# Patient Record
(demographics unavailable — no encounter records)

---

## 2024-11-23 NOTE — PHYSICAL EXAM
[Clear] : right tympanic membrane clear [Erythema] : no erythema [Bulging] : not bulging [Purulent Effusion] : no purulent effusion [Erythematous Oropharynx] : nonerythematous oropharynx [Supple] : supple [Wheezing] : wheezing [Rales] : no rales [Rhonchi] : no rhonchi [Subcostal Retractions] : no subcostal retractions [Suprasternal Retractions] : no suprasternal retractions [No Abnormal Lymph Nodes Palpated] : no abnormal lymph nodes palpated [NL] : warm, clear [FreeTextEntry4] : congestion

## 2024-11-23 NOTE — HISTORY OF PRESENT ILLNESS
[de-identified] : barking cough, congestion [FreeTextEntry6] : Barking cough x 3 days, now coughing is getting worse and disrupting his sleep at night. Upright he is ok, but at nighttime cough is worse. Not productive cough, dry, barking.

## 2024-11-23 NOTE — DISCUSSION/SUMMARY
[FreeTextEntry1] : Pt with wheezing on exam and worsening cough, disruptive to sleep. Through discussion with parents will test with RVP to assess for mycoplasma and RSV, which could be causing wheezing as well. If symptoms not improved in 48 hours and RSV negative and/or mycoplasma positive, will treat with Azithromycin. Return precautions discussed. Continue supportive care.

## 2024-12-11 NOTE — HISTORY OF PRESENT ILLNESS
[de-identified] : lethargic and not drinking  [FreeTextEntry6] : 5 days fever 103-105 RSV pos sunday at [facility] ER Now tachypneic and hypoxemic with patchy rales Tired

## 2024-12-11 NOTE — DISCUSSION/SUMMARY
[FreeTextEntry1] : I called over to [facility] and [facility], we decided to admit to [place].  I spoke to the hospitalist who can see him in the emergency room and help facilitate admission for rehydration and treatment for community-acquired pneumonia.  I do not think this is RSV alone.  I will be available to discuss care through the night as needed.

## 2024-12-11 NOTE — PHYSICAL EXAM
[Tired appearing] : tired appearing [NL] : moves all extremities x4, warm, well perfused x4 [de-identified] : Dry mucous membranes [FreeTextEntry7] : Moving air but patchy rales, good air entry [de-identified] : Pale slightly cyanotic lips

## 2024-12-26 NOTE — HISTORY OF PRESENT ILLNESS
[de-identified] : CONSULT [FreeTextEntry6] : Fine motor and speech concerns Discussed social development in detai;

## 2024-12-26 NOTE — HISTORY OF PRESENT ILLNESS
[de-identified] : CONSULT [FreeTextEntry6] : Fine motor and speech concerns Discussed social development in detai;

## 2025-01-04 NOTE — DISCUSSION/SUMMARY
[FreeTextEntry1] :  ASTHMA: Asthma is a disease of airways (tubes or conduits that air flows through), which may be affected in two separate but related ways. The first is the problem of inflammation and mucus inside the center of the airways, which causes clogging and restricts air flow. Secondly, muscles that encircle the outside of airways may spasm. When triggered, these muscles will tighten around the airways, also causing restriction to air flow. The combination of swelling inside and muscles spasming around the outside of airways causes wheezing as air flow is obstructed.  Albuterol is an inhaled medication that works to open ("un-crush") airways by relaxing those muscles. Oral steroids like prednisone and prednisolone can help alleviate swelling. Asthma triggers may occur alone or in combinations and include common colds, allergens, smoke, pollution, mold, and exercise. Avoiding triggers is key, but not all triggers can or should be avoided. Exercise is good for asthmatic patients and should not be avoided. However, the asthma can and should be prevented from triggering exercise induced asthma (also known as "sports asthma"). One in 10 Olympic athletes has asthma; it does not need to be a quality of life limiting disease.  DIAGNOSIS: Recurrent wheezing or very disruptive coughing that is relieved by albuterol and prednisone (prednisolone) is the most common method of diagnosing asthma in young children.  Once diagnosed, asthma may need a different (preventive) approach. Namely, some children need a daily inhaler containing a (low dose) steroid to prevent wheezing that may be triggered by common colds, exercise, or allergens.  ACTION PLANS: Having back up medications may keep patients safe by providing timely intervention of treatments that open airways. Moreover, the backup medications may avoid the need to find access to urgent care. Use the backup medications as needed and follow up with your doctor as directed. RELATED CONDITIONS: Sometimes large adenoids or acid reflux may mimic asthma. And otherwise, sometimes these problems can trigger asthma. Snoring is a common sign of large adenoids, but reflux is an easy diagnosis to overlook when its main symptoms are manifesting more as cough, and less abdominal pain and nausea.

## 2025-01-04 NOTE — HISTORY OF PRESENT ILLNESS
[de-identified] : Wet Cough [FreeTextEntry6] : 4 days last 2 nights a lot of coughing 2 days clear rhinorrhea   Mom had EIA  He has exercise induced cough when sick the cough from RSV lasted a while and was disruptive

## 2025-01-04 NOTE — CARE PLAN
[Care Plan reviewed and provided to patient/caregiver] : Care plan reviewed and provided to patient/caregiver [Care Plan reviewed every ___ weeks] : Care plan reviewed every [unfilled] weeks [Care Plan managed/Care coordinated by: ___] : Care plan managed/Care coordinated by: [unfilled] [FreeTextEntry2] :  Watch for asthma triggered by upper respiratory infections.Watch for asthma triggered by exercise and or allergies. Watch for nightly snoring which can be a sign of large adenoids; large adenoids can worsen asthma. Watch for abdominal complaints that may be a sign of reflux, which can also worsen asthma. [FreeTextEntry3] : Rx and expectant care. Follow up as need for fever trend, new, or worsening symptoms.

## 2025-02-24 NOTE — HISTORY OF PRESENT ILLNESS
[de-identified] : fever/runny nose  [FreeTextEntry6] : 3 days started 103 now trending down to 101 Clear runny nose day 1-23 now green  Wet cough Sore throat Slept poor from cough   Eating well Acting well

## 2025-02-24 NOTE — DISCUSSION/SUMMARY
[FreeTextEntry1] : Symptoms likely due to viral URI.  Children can get 6-10 colds per year and they are often clustered during the fall and winter.  Generally if the cough is keeping the child up more than 2 nights in a row in a significant way, that could be 1 concerning reason to consider returning.  Otherwise shortness of breath, lethargy, or irritability that is highly disruptive to sleep could be warning signs that would warrant evaluation as well.  Additionally, fevers that are trending higher after 3 days may be a sign of a complication that warrants evaluation.   If fevers occur, they tend to be at their highest on day one or two of fever, then trend lower.  It is not necessary to treat fevers for the sake of lowering the body temperature.  Treating fevers does not make children safer and does not lower the risk of a febrile seizure (a seizure associated with fever).  Febrile seizures are uncommon, and when they occur do not hurt the child.  But they can be upsetting understandably so to the parents.  However, children that do have an underlying seizure disorder may benefit from treating the fevers.  Fevers do not necessarily respond to fever medication; and if they do not it is not necessarily a bad sign. Patients may appear more ill when the fever is trending higher, but should be acting somewhat better when the fever is down. When fevers are present they typically tend to come a and go a few times each day, and tend to be worse at night.    Give supportive care including treatment for discomfort.  Follow up as needed for fever trend, new, or worsening symptoms.  Provide more frequent fluids and food as the intake is often in smaller more frequent amounts.   Consider nasal saline, suction only if it provides comfort or easier breathing. Follow up as needed for fever trend, new, or worsening symptoms.   Reviewed benefits and limitations of testing.  healthychildren.org for reference: Tools and Tips and link to symptom .   Over the course of the winter he is not declared himself and is an asthmatic.  For this illness, we should treat him expectantly.  Should he develop highly interrupted sleep from coughing then we could consider treating him depending on how the symptoms develop.  For now it is best left to the test of time.

## 2025-02-24 NOTE — HISTORY OF PRESENT ILLNESS
[de-identified] : fever/runny nose  [FreeTextEntry6] : 3 days started 103 now trending down to 101 Clear runny nose day 1-23 now green  Wet cough Sore throat Slept poor from cough   Eating well Acting well

## 2025-03-07 NOTE — DISCUSSION/SUMMARY
[Normal Growth] : growth [Normal Development] : development  [No Elimination Concerns] : elimination [Continue Regimen] : feeding [No Skin Concerns] : skin [Normal Sleep Pattern] : sleep [None] : no medical problems [Anticipatory Guidance Given] : Anticipatory guidance addressed as per the history of present illness section [No Vaccines] : no vaccines needed [No Medications] : ~He/She~ is not on any medications [] : The components of the vaccine(s) to be administered today are listed in the plan of care. The disease(s) for which the vaccine(s) are intended to prevent and the risks have been discussed with the caretaker.  The risks are also included in the appropriate vaccination information statements which have been provided to the patient's caregiver.  The caregiver has given consent to vaccinate. [FreeTextEntry1] : OAE 54043 and Amblyopia 58036 screen attempts reviewed   Lead Risk Assessment 97279  Brush teeth twice a day with soft toothbrush. Recommend visit to dentist.  Child needs to ride in a belt-positioning booster seat until  4 feet 9 inches has been reached and are between 8 and 12 years of age Use consistent, positive discipline, and mainly  use accountability over punishments. Read aloud with children before bed  Limit screen time per age appropriate. [employer].org for a variety of child rearing matters, including safety  Reviewed options for receiving the appropriate amount of Fluoride potentially through diet, some toothpaste products, or purchased drinks that may unknowingly contain fluoride reviewed. [facility] does not have fluoride in its water supply, there for supplementation with fluoride may be important to promote strong enamel development. However, too much fluoride can cause fluorosis and is a different i.e.significant problem as well. Appropriate brushing for age reviewed, but it should not become a fight. Oral hygiene includes avoidance of triggers for caries such as bottles, appropriate brushing, avoiding sharing pacifiers, discontinuing pacifiers, avoiding sticky sugar based products. Annual Dental visit as directed based on age and dentition.  Multivitamins are not routinely recommended by the American Academy of Pediatrics. However, if the diet is not appropriate for age then supplementation is recommended. Options for MVI with and without fluoride reviewed.   Return for well child check in 1 year.

## 2025-03-07 NOTE — PHYSICAL EXAM
